# Patient Record
Sex: FEMALE | Race: OTHER | HISPANIC OR LATINO | URBAN - METROPOLITAN AREA
[De-identification: names, ages, dates, MRNs, and addresses within clinical notes are randomized per-mention and may not be internally consistent; named-entity substitution may affect disease eponyms.]

---
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
## 2023-01-20 ENCOUNTER — EMERGENCY (EMERGENCY)
Facility: HOSPITAL | Age: 18
LOS: 1 days | Discharge: HOME | End: 2023-01-20
Attending: EMERGENCY MEDICINE
Payer: COMMERCIAL

## 2023-01-20 PROCEDURE — 99284 EMERGENCY DEPT VISIT MOD MDM: CPT

## 2023-01-21 VITALS
RESPIRATION RATE: 16 BRPM | SYSTOLIC BLOOD PRESSURE: 110 MMHG | TEMPERATURE: 98 F | HEART RATE: 78 BPM | DIASTOLIC BLOOD PRESSURE: 70 MMHG | OXYGEN SATURATION: 98 %

## 2023-01-21 VITALS
WEIGHT: 130.07 LBS | SYSTOLIC BLOOD PRESSURE: 111 MMHG | DIASTOLIC BLOOD PRESSURE: 67 MMHG | RESPIRATION RATE: 16 BRPM | OXYGEN SATURATION: 97 % | HEART RATE: 82 BPM | TEMPERATURE: 98 F

## 2023-01-21 LAB
FLUAV AG NPH QL: SIGNIFICANT CHANGE UP
FLUBV AG NPH QL: SIGNIFICANT CHANGE UP
HCG SERPL-ACNC: <4 MIU/ML — SIGNIFICANT CHANGE UP
RSV RNA NPH QL NAA+NON-PROBE: SIGNIFICANT CHANGE UP
SARS-COV-2 RNA SPEC QL NAA+PROBE: DETECTED

## 2023-01-21 PROCEDURE — 99284 EMERGENCY DEPT VISIT MOD MDM: CPT | Mod: 25

## 2023-01-21 PROCEDURE — 96361 HYDRATE IV INFUSION ADD-ON: CPT

## 2023-01-21 PROCEDURE — 96375 TX/PRO/DX INJ NEW DRUG ADDON: CPT

## 2023-01-21 PROCEDURE — 96374 THER/PROPH/DIAG INJ IV PUSH: CPT

## 2023-01-21 PROCEDURE — 84702 CHORIONIC GONADOTROPIN TEST: CPT

## 2023-01-21 PROCEDURE — 36415 COLL VENOUS BLD VENIPUNCTURE: CPT

## 2023-01-21 PROCEDURE — 87637 SARSCOV2&INF A&B&RSV AMP PRB: CPT

## 2023-01-21 RX ORDER — METOCLOPRAMIDE HCL 10 MG
10 TABLET ORAL ONCE
Refills: 0 | Status: DISCONTINUED | OUTPATIENT
Start: 2023-01-21 | End: 2023-01-28

## 2023-01-21 RX ORDER — ACETAMINOPHEN 500 MG
1000 TABLET ORAL ONCE
Refills: 0 | Status: COMPLETED | OUTPATIENT
Start: 2023-01-21 | End: 2023-01-21

## 2023-01-21 RX ORDER — KETOROLAC TROMETHAMINE 30 MG/ML
15 SYRINGE (ML) INJECTION ONCE
Refills: 0 | Status: DISCONTINUED | OUTPATIENT
Start: 2023-01-21 | End: 2023-01-21

## 2023-01-21 RX ORDER — SODIUM CHLORIDE 9 MG/ML
1000 INJECTION INTRAMUSCULAR; INTRAVENOUS; SUBCUTANEOUS ONCE
Refills: 0 | Status: COMPLETED | OUTPATIENT
Start: 2023-01-21 | End: 2023-01-21

## 2023-01-21 RX ADMIN — SODIUM CHLORIDE 1000 MILLILITER(S): 9 INJECTION INTRAMUSCULAR; INTRAVENOUS; SUBCUTANEOUS at 02:05

## 2023-01-21 RX ADMIN — Medication 15 MILLIGRAM(S): at 02:01

## 2023-01-21 RX ADMIN — Medication 400 MILLIGRAM(S): at 02:02

## 2023-01-21 RX ADMIN — Medication 200 MILLIGRAM(S): at 02:01

## 2023-01-21 NOTE — ED PROVIDER NOTE - PATIENT PORTAL LINK FT
You can access the FollowMyHealth Patient Portal offered by [facility] by registering at the following website: [email or web address]. By joining [employer]’s FollowMyHealth portal, you will also be able to view your health information using other applications (apps) compatible with our system.

## 2023-01-21 NOTE — ED ADULT NURSE NOTE - CAS ELECT INFOMATION PROVIDED
DC instructions given by MD and verbalized understanding. Pt remains alert and O x4. NAD noted. Resp E/U../DC instructions

## 2023-01-21 NOTE — ED PROVIDER NOTE - CLINICAL SUMMARY MEDICAL DECISION MAKING FREE TEXT BOX
17F with pmh migraines presenting with URI sx and migraine HA. Given IVF, iv reglan, iv tylenol. On PE reproducible chest pain consistent with costochondritis 2/2 cough. Flu swab sent. 17F with pmh migraines presenting with URI sx and migraine HA. Given IVF, iv reglan, iv tylenol. On PE reproducible chest pain consistent with costochondritis 2/2 cough. Flu swab sent.    Covid +. On re assessment, pt feeling improved. migraine headache resolving. Pt asking for discharge. Vitals remained stable. Discussed strict return precautions.

## 2023-01-21 NOTE — ED PROVIDER NOTE - CARE PROVIDER_API CALL
Andrea Booker; PhD)  Neurology; Vascular Neurology  [address] [place] [address]  Phone: [phone]  Fax: [phone]  Follow Up Time:

## 2023-01-21 NOTE — ED ADULT NURSE NOTE - CHIEF COMPLAINT QUOTE
Ambulatory to ED c/o headache and difficulty breathing x3 days. Patient denies cough or subjective fever sx, tearful at triage, RR even and unlabored.

## 2023-01-21 NOTE — ED PROVIDER NOTE - NSFOLLOWUPINSTRUCTIONS_ED_ALL_ED_FT
Follow up with PCP within 3 days.   Motrin and tylenol for any pain or fever.   Return for any worsening symptoms.     - Return to the ED for any new or worsening symptoms including but not limited to difficulty breathing, severe weakness, confusion, etc.  - Take Tylenol (Acetaminophen) 650mg as needed for pain or fever  - Stay well hydrated.   - Avoid contact with anybody who is sick OR at risk populations including elderly, young, pregnant patients, immune compromised people  - Wash hands frequently in warm soapy water for at least 20 seconds     Viral Respiratory Infection    A viral respiratory infection is an illness that affects parts of the body used for breathing, like the lungs, nose, and throat. It is caused by a germ called a virus. Symptoms can include runny nose, coughing, sneezing, fatigue, body aches, sore throat, fever, or headache. Over the counter medicine can be used to manage the symptoms but the infection typically goes away on its own in 5 to 10 days.     SEEK IMMEDIATE MEDICAL CARE IF YOU HAVE ANY OF THE FOLLOWING SYMPTOMS: shortness of breath, chest pain, fever over 10 days, or lightheadedness/dizziness.    Headache    A headache is pain or discomfort felt around the head or neck area. The specific cause of a headache may not be found as there are many types including tension headaches, migraine headaches, and cluster headaches. Watch your condition for any changes. Things you can do to manage your pain include taking over the counter and prescription medications as instructed by your health care provider, lying down in a dark quiet room, limiting stress, getting regular sleep, and refraining from alcohol and tobacco products.    SEEK IMMEDIATE MEDICAL CARE IF YOU HAVE ANY OF THE FOLLOWING SYMPTOMS: fever, vomiting, stiff neck, loss of vision, problems with speech, muscle weakness, loss of balance, trouble walking, passing out, or confusion.    Daron un seguimiento con el PCP dentro de los 3 días.  Motrin y tylenol para cualquier dolor o fiebre.  Regrese por cualquier empeoramiento de los síntomas.    - Regrese al servicio de urgencias por cualquier síntoma nuevo o que empeore, incluidos, entre otros, dificultad para respirar, debilidad grave, confusión, etc.  - [facility] Tylenol (acetaminofén) 650 mg según sea necesario para el dolor o la fiebre  - Manténgase janette hidratado.  - Evite el contacto con cualquier persona que esté enferma O con poblaciones de riesgo, incluidos ancianos, jóvenes, pacientes embarazadas, personas inmunodeprimidas  - Lávese las ramirez con frecuencia con agua tibia y jabón abelino al menos 20 segundos    Infección respiratoria viral    Alejandra infección respiratoria viral es alejandra enfermedad que afecta partes del cuerpo que se usan para respirar, reyes los pulmones, la nariz y la garganta. Es causada por un germen llamado virus. Los síntomas pueden incluir secreción nasal, tos, estornudos, fatiga, samara corporales, dolor de garganta, fiebre o dolor de manjula. Se pueden usar medicamentos de venta natasha para controlar los síntomas, conner la infección generalmente desaparece por sí raghav en 5 a 10 días.    BUSQUE ATENCIÓN MÉDICA INMEDIATA SI TIENE ALGUNO DE LOS SIGUIENTES SÍNTOMAS: dificultad para respirar, dolor en el pecho, fiebre abelino 10 días o aturdimiento/mareos.    Dolor de manjula    Un dolor de manjula es un dolor o molestia que se siente alrededor del área de la manjula o el bradley. Es posible que no se encuentre la causa específica de un dolor de manjula, ya que hay muchos tipos, incluidos los samara de manjula por tensión, los samara de manjula por migraña y los samara de manjula en racimo. Marge garcia condición para cualquier cambio. Las cosas que puede hacer para controlar garcia dolor incluyen josephine medicamentos de venta natasha y recetados según las instrucciones de garcia proveedor de atención médica, recostarse en alejandra habitación oscura y tranquila, limitar el estrés, dormir con regularidad y abstenerse de consumir alcohol y productos de tabaco.    BUSQUE ATENCIÓN MÉDICA INMEDIATA SI TIENE ALGUNO DE LOS SIGUIENTES SÍNTOMAS: fiebre, vómitos, rigidez en el bradley, pérdida de la visión, problemas con el habla, debilidad muscular, pérdida del equilibrio, dificultad para caminar, desmayo o confusión.

## 2023-01-21 NOTE — ED PROVIDER NOTE - ATTENDING APP SHARED VISIT CONTRIBUTION OF CARE
I, Tia Anglin, performed a face to face bedside interview with this patient regarding history of present illness, review of symptoms and relevant past medical, social and family history.  I completed an independent physical examination. Medical decision making, follow-up on ordered tests (ie labs, radiologic studies) and re-evaluation of the patient's status has been communicated to the ACP.  Disposition of the patient will be based on test outcome and response to ED interventions.

## 2023-01-21 NOTE — ED PROVIDER NOTE - OBJECTIVE STATEMENT
17F with pmh migraine headaches presenting with cold sx and left sided headache.   Pt states she had 3 days of runny nose, sore throat, cough, chest pain. 2 days ago subjective fever. chest pain worse with cough. Pt has been taking old Rx of Augmentin for the cough at home.   Pt states the headache feels similar to prior migraines but worse pain now. Pain is mostly behind the left eye. With sensitivity to light and sound. Took 600mg ibuprofen at home without relief

## 2023-04-03 ENCOUNTER — EMERGENCY (EMERGENCY)
Facility: HOSPITAL | Age: 18
LOS: 1 days | Discharge: HOME | End: 2023-04-03
Attending: EMERGENCY MEDICINE
Payer: COMMERCIAL

## 2023-04-03 VITALS
SYSTOLIC BLOOD PRESSURE: 110 MMHG | WEIGHT: 119.05 LBS | HEART RATE: 132 BPM | HEIGHT: 64 IN | TEMPERATURE: 100 F | DIASTOLIC BLOOD PRESSURE: 60 MMHG | OXYGEN SATURATION: 98 % | RESPIRATION RATE: 18 BRPM

## 2023-04-03 LAB — S PYO DNA THROAT QL NAA+PROBE: SIGNIFICANT CHANGE UP

## 2023-04-03 PROCEDURE — 99284 EMERGENCY DEPT VISIT MOD MDM: CPT

## 2023-04-03 PROCEDURE — 87651 STREP A DNA AMP PROBE: CPT

## 2023-04-03 PROCEDURE — T1013: CPT

## 2023-04-03 PROCEDURE — 87798 DETECT AGENT NOS DNA AMP: CPT

## 2023-04-03 PROCEDURE — 99283 EMERGENCY DEPT VISIT LOW MDM: CPT

## 2023-04-03 RX ORDER — IBUPROFEN 200 MG
400 TABLET ORAL ONCE
Refills: 0 | Status: COMPLETED | OUTPATIENT
Start: 2023-04-03 | End: 2023-04-03

## 2023-04-03 RX ADMIN — Medication 400 MILLIGRAM(S): at 20:02

## 2023-04-03 NOTE — ED ADULT TRIAGE NOTE - CHIEF COMPLAINT QUOTE
mom states dgtr has flulike symptoms, cough, fever, onset 3 days ago,   A&Ox3, resp wnl, c/o throat pain, B/L ear pain, Tylenol 2 pm

## 2023-04-03 NOTE — ED PROVIDER NOTE - PATIENT PORTAL LINK FT
You can access the FollowMyHealth Patient Portal offered by [facility] by registering at the following website: [email or web address]. By joining [employer]’s FollowMyHealth portal, you will also be able to view your health information using other applications (apps) compatible with our system.

## 2023-04-21 ENCOUNTER — OFFICE (OUTPATIENT)
Dept: URBAN - METROPOLITAN AREA CLINIC 94 | Facility: CLINIC | Age: 18
Setting detail: OPHTHALMOLOGY
End: 2023-04-21
Payer: COMMERCIAL

## 2023-04-21 DIAGNOSIS — H04.123: ICD-10-CM

## 2023-04-21 DIAGNOSIS — H16.041: ICD-10-CM

## 2023-04-21 PROCEDURE — 92002 INTRM OPH EXAM NEW PATIENT: CPT | Performed by: OPHTHALMOLOGY

## 2023-04-21 ASSESSMENT — CONFRONTATIONAL VISUAL FIELD TEST (CVF)
OD_FINDINGS: FULL
OS_FINDINGS: FULL

## 2023-04-21 ASSESSMENT — REFRACTION_CURRENTRX
OD_SPHERE: -0.75
OD_OVR_VA: 20/
OD_CYLINDER: -0.50
OS_OVR_VA: 20/
OS_CYLINDER: -0.25
OS_SPHERE: -2.00
OD_AXIS: 008
OD_VPRISM_DIRECTION: SV
OS_AXIS: 005
OS_VPRISM_DIRECTION: SV

## 2023-04-21 ASSESSMENT — REFRACTION_AUTOREFRACTION
OD_AXIS: 169
OD_SPHERE: -0.75
OS_CYLINDER: -0.25
OD_CYLINDER: -0.50
OS_SPHERE: -2.25
OS_AXIS: 008

## 2023-04-21 ASSESSMENT — SPHEQUIV_DERIVED
OD_SPHEQUIV: -1
OD_SPHEQUIV: -1
OS_SPHEQUIV: -2.375
OS_SPHEQUIV: -2.125

## 2023-04-21 ASSESSMENT — KERATOMETRY
OD_K2POWER_DIOPTERS: 44.75
OS_K1POWER_DIOPTERS: 44.25
OS_AXISANGLE_DEGREES: 104
OD_K1POWER_DIOPTERS: 44.00
OS_K2POWER_DIOPTERS: 44.75
OD_AXISANGLE_DEGREES: 085

## 2023-04-21 ASSESSMENT — SUPERFICIAL PUNCTATE KERATITIS (SPK)
OS_SPK: 1+
OD_SPK: 1+

## 2023-04-21 ASSESSMENT — REFRACTION_MANIFEST
OD_VA1: 20/20
OS_CYLINDER: -0.25
OD_CYLINDER: -0.50
OD_SPHERE: -0.75
OS_VA1: 20/20
OD_AXIS: 008
OS_SPHERE: -2.00
OS_AXIS: 005

## 2023-04-21 ASSESSMENT — AXIALLENGTH_DERIVED
OS_AL: 24.1634
OD_AL: 23.6603
OD_AL: 23.6603
OS_AL: 24.0617

## 2023-04-21 ASSESSMENT — VISUAL ACUITY
OD_BCVA: 20/25
OS_BCVA: 20/25

## 2023-09-02 ENCOUNTER — EMERGENCY (EMERGENCY)
Facility: HOSPITAL | Age: 18
LOS: 1 days | Discharge: HOME | End: 2023-09-02
Attending: EMERGENCY MEDICINE
Payer: COMMERCIAL

## 2023-09-02 VITALS
OXYGEN SATURATION: 100 % | DIASTOLIC BLOOD PRESSURE: 77 MMHG | HEIGHT: 62.99 IN | WEIGHT: 110.23 LBS | SYSTOLIC BLOOD PRESSURE: 122 MMHG | TEMPERATURE: 98 F | RESPIRATION RATE: 16 BRPM | HEART RATE: 83 BPM

## 2023-09-02 LAB
APPEARANCE UR: CLEAR — SIGNIFICANT CHANGE UP
BILIRUB UR-MCNC: NEGATIVE — SIGNIFICANT CHANGE UP
COLOR SPEC: YELLOW — SIGNIFICANT CHANGE UP
DIFF PNL FLD: ABNORMAL
EPI CELLS # UR: SIGNIFICANT CHANGE UP
GLUCOSE UR QL: NEGATIVE MG/DL — SIGNIFICANT CHANGE UP
HCG UR QL: NEGATIVE — SIGNIFICANT CHANGE UP
KETONES UR-MCNC: NEGATIVE — SIGNIFICANT CHANGE UP
LEUKOCYTE ESTERASE UR-ACNC: ABNORMAL
NITRITE UR-MCNC: NEGATIVE — SIGNIFICANT CHANGE UP
PH UR: 6.5 — SIGNIFICANT CHANGE UP (ref 5–8)
PROT UR-MCNC: 100
RBC CASTS # UR COMP ASSIST: SIGNIFICANT CHANGE UP /HPF (ref 0–4)
SP GR SPEC: 1.02 — SIGNIFICANT CHANGE UP (ref 1.01–1.02)
UROBILINOGEN FLD QL: NEGATIVE MG/DL — SIGNIFICANT CHANGE UP
WBC UR QL: SIGNIFICANT CHANGE UP /HPF (ref 0–5)

## 2023-09-02 PROCEDURE — 99284 EMERGENCY DEPT VISIT MOD MDM: CPT

## 2023-09-02 PROCEDURE — T1013: CPT

## 2023-09-02 PROCEDURE — 87591 N.GONORRHOEAE DNA AMP PROB: CPT

## 2023-09-02 PROCEDURE — 87491 CHLMYD TRACH DNA AMP PROBE: CPT

## 2023-09-02 PROCEDURE — 87086 URINE CULTURE/COLONY COUNT: CPT

## 2023-09-02 PROCEDURE — 99283 EMERGENCY DEPT VISIT LOW MDM: CPT

## 2023-09-02 PROCEDURE — 81001 URINALYSIS AUTO W/SCOPE: CPT

## 2023-09-02 PROCEDURE — 81025 URINE PREGNANCY TEST: CPT

## 2023-09-02 RX ORDER — CEPHALEXIN 500 MG
1 CAPSULE ORAL
Qty: 10 | Refills: 0
Start: 2023-09-02 | End: 2023-09-06

## 2023-09-02 RX ORDER — CEPHALEXIN 500 MG
500 CAPSULE ORAL ONCE
Refills: 0 | Status: COMPLETED | OUTPATIENT
Start: 2023-09-02 | End: 2023-09-02

## 2023-09-02 RX ADMIN — Medication 500 MILLIGRAM(S): at 21:26

## 2023-09-02 NOTE — ED PROVIDER NOTE - CLINICAL SUMMARY MEDICAL DECISION MAKING FREE TEXT BOX
16 y/o female with no sign medical history presents to the Ed c/o suprapubic pain and burning with urination for the past 4 days. 16 y/o female with no sign medical history presents to the Ed c/o suprapubic pain and burning with urination for the past 4 days. urinalysis not convincing but will treat symptoms, pregnancy negative, Pt reassessed, pt feeling better at this time, vss, pt able to walk, talk and vocalized plan of action. Discussed in depth and explained to pt in depth the next steps that need to be taking including proper follow up with PCP or specialists. All incidental findings were discussed with pt as well. Pt verbalized their concerns and all questions were answered. Pt understands dispo and wants discharge. Given good instructions when to return to ED and importance of f/u.

## 2023-09-02 NOTE — ED ADULT NURSE NOTE - CADM POA PRESS ULCER
[General Appearance - Well Developed] : well developed [General Appearance - Well Nourished] : well nourished [Abdomen Soft] : soft [Skin Color & Pigmentation] : normal skin color and pigmentation [] : no respiratory distress [Oriented To Time, Place, And Person] : oriented to person, place, and time [Affect] : the affect was normal [Normal Station and Gait] : the gait and station were normal for the patient's age [No Focal Deficits] : no focal deficits [FreeTextEntry1] : no pallor  No

## 2023-09-02 NOTE — ED PROVIDER NOTE - PATIENT PORTAL LINK FT
You can access the FollowMyHealth Patient Portal offered by [facility] by registering at the following website: [email or web address]. By joining [employer]’s FollowMyHealth portal, you will also be able to view your health information using other applications (apps) compatible with our system.

## 2023-09-02 NOTE — ED ADULT NURSE NOTE - OBJECTIVE STATEMENT
Pt received A&Ox4 c/o suprapubic pain with burning with urination x 4 days. Pt denies any fevers/chills. Respirations even & unlabored. NAD. Pt made aware of plan of care and verbalized understanding.

## 2023-09-02 NOTE — ED PROVIDER NOTE - OBJECTIVE STATEMENT
16 y/o female with no sign medical history presents to the Ed c/o suprapubic pain and burning with urination for the past 4 days. Pt notes gradual onset of pain. not sexually active. No vaginal discharge, no fevers, no chills.

## 2023-09-04 LAB
CULTURE RESULTS: SIGNIFICANT CHANGE UP
SPECIMEN SOURCE: SIGNIFICANT CHANGE UP

## 2023-09-05 LAB
C TRACH RRNA SPEC QL NAA+PROBE: SIGNIFICANT CHANGE UP
N GONORRHOEA RRNA SPEC QL NAA+PROBE: SIGNIFICANT CHANGE UP

## 2024-08-20 NOTE — ED ADULT TRIAGE NOTE - CHIEF COMPLAINT QUOTE
Ambulatory to ED c/o headache and difficulty breathing x3 days. Patient denies cough or subjective fever sx, tearful at triage, RR even and unlabored. yes

## 2024-10-14 ENCOUNTER — EMERGENCY (EMERGENCY)
Facility: HOSPITAL | Age: 19
LOS: 1 days | Discharge: HOME | End: 2024-10-14
Attending: STUDENT IN AN ORGANIZED HEALTH CARE EDUCATION/TRAINING PROGRAM
Payer: COMMERCIAL

## 2024-10-14 VITALS
WEIGHT: 138.89 LBS | RESPIRATION RATE: 20 BRPM | OXYGEN SATURATION: 98 % | HEIGHT: 63 IN | DIASTOLIC BLOOD PRESSURE: 81 MMHG | TEMPERATURE: 98 F | SYSTOLIC BLOOD PRESSURE: 126 MMHG | HEART RATE: 88 BPM

## 2024-10-14 PROCEDURE — 99284 EMERGENCY DEPT VISIT MOD MDM: CPT

## 2024-10-14 NOTE — ED ADULT TRIAGE NOTE - CHIEF COMPLAINT QUOTE
Pt arrives to ED c/o L sided rib cage  pain started a week ago progressively getting worse. breathing easy and unlabored

## 2024-10-15 PROCEDURE — 71046 X-RAY EXAM CHEST 2 VIEWS: CPT

## 2024-10-15 PROCEDURE — 71046 X-RAY EXAM CHEST 2 VIEWS: CPT | Mod: 26

## 2024-10-15 PROCEDURE — T1013: CPT

## 2024-10-15 PROCEDURE — 99283 EMERGENCY DEPT VISIT LOW MDM: CPT | Mod: 25

## 2024-10-15 PROCEDURE — 93010 ELECTROCARDIOGRAM REPORT: CPT

## 2024-10-15 PROCEDURE — 93005 ELECTROCARDIOGRAM TRACING: CPT

## 2024-10-15 RX ORDER — ACETAMINOPHEN 325 MG
650 TABLET ORAL ONCE
Refills: 0 | Status: COMPLETED | OUTPATIENT
Start: 2024-10-15 | End: 2024-10-15

## 2024-10-15 RX ADMIN — Medication 650 MILLIGRAM(S): at 01:20

## 2024-10-15 RX ADMIN — Medication 1500 MILLIGRAM(S): at 01:20

## 2024-10-15 RX ADMIN — Medication 600 MILLIGRAM(S): at 01:20

## 2024-10-15 NOTE — ED PROVIDER NOTE - PHYSICAL EXAMINATION
General: In NAD, non-toxic/well-appearing.  Skin: No rashes or lesions. Warm, dry, color normal for race.   Head: Normocephalic/atraumatic.   Eyes: Sclera anicteric, conjunctivae clear b/l. PERRLA, EOMI.   Neck: Supple, FROM.   Cardio: +Reproducible chest wall tenderness. Rate and rhythm regular. No audible murmur.  PV: No edema of feet/ankles. No calf swelling/tenderness.   Resp: Breath sounds vesicular, symmetrical and without rales, rhonchi or wheezing b/l.  Abd: Non-distended. Soft, non-tender. No rebound, guarding.   MSK: MAEx4. FROM. Strength 5/5 upper and lower extremities.   Neuro: A&Ox3. Appears nonfocal.

## 2024-10-15 NOTE — ED PROVIDER NOTE - NSFOLLOWUPINSTRUCTIONS_ED_ALL_ED_FT
- Prescription sent to pharmacy.  - Ibuprofen 600mg every 6 hours as needed for pain.  - Acetaminophen 650mg every 6 hours as needed for pain.   - Please bring all documentation from your ED visit to any related future follow up appointment.  - Please call to schedule follow up appointment with your primary care physician within 24-48 hours.  - Please seek immediate medical attention or return to the ED for any new/worsening, signs/symptoms, or concerns.      - Receta enviada a farmacia.  - Ibuprofeno 600 mg cada 6 horas según sea necesario para el dolor.  - Acetaminofén 650 mg cada 6 horas según sea necesario para el dolor.   - Traiga toda la documentación de garcia visita al servicio de urgencias a cualquier ling de seguimiento futura relacionada.  - Llame para programar alejandra ling de seguimiento con garcia médico de atención primaria dentro de las 24 a 48 horas.  - Busque atención médica inmediata o regrese al servicio de urgencias si detecta signos, síntomas o inquietudes nuevos o que empeoran.    Dolor de pecho inespecífico    Nonspecific Chest Pain      El dolor de pecho puede deberse a muchas afecciones diferentes. Algunas causas del dolor de pecho pueden ser potencialmente mortales. Estas requieren tratamiento inmediato. Algunas causas grave de dolor en el pecho son las siguientes:  •Infarto de miocardio.      •Un desgarro en el vaso sanguíneo principal del cuerpo.      •Enrojecimiento e hinchazón (inflamación) alrededor del corazón.      •Un coágulo de didi en los pulmones.      Otras causas de dolor en el pecho pueden no ser tan graves. Estos incluyen los siguientes:  •Acidez estomacal.      •Ansiedad o estrés.      •Daño en los huesos o músculos del corazón.      •Infecciones pulmonares.      El dolor de pecho puede provocar las siguientes sensaciones:  •Dolor o molestias en el pecho.      •Dolor opresivo, continuo o constrictivo.       •Ardor u hormigueo.       •Dolor sordo o intenso que empeora al moverse, toser o inhalar profundamente.       •Dolor o molestias que también se sienten en la espalda, el bradley, la mandíbula, el hombro o el brazo, o dolor que se extiende a cualquiera de estas zonas.      Es difícil saber si la causa del dolor es algo grave o algo que no es tan grave. Por lo tanto, es importante que consulte al médico inmediatamente si tiene dolor en el pecho.      Siga estas indicaciones en garcia casa:    Medicamentos     •[place] los medicamentos de venta natasha y los recetados solamente reyes se lo haya indicado el médico.      •Si le recetaron un antibiótico, tómelo reyes se lo haya indicado el médico. No deje de josephine los antibióticos aunque comience a sentirse mejor.        Estilo de cecy      •Ly[place] reposo reyes se lo haya indicado el médico.      • No consuma ningún producto que contenga nicotina o tabaco, reyes cigarrillos, cigarrillos electrónicos y tabaco de mascar. Si necesita ayuda para dejar de fumar, consulte al médico.      • No daniela alcohol.    •Lyly cambios en garcia estilo de cecy según las indicaciones del médico. Estos pueden incluir lo siguiente:  •Practicar actividad física con regularidad. Pregúntele al médico qué actividades son seguras para usted.      •Seguir alejandra dieta cardiosaludable. Un especialista en dietas y alimentación (nutricionista) puede ayudarlo a que lyly elecciones saludables.      •Mantener un peso saludable.      •Tratar la diabetes o la presión arterial brittany, si es necesario.      •Reducir el estrés. Las actividades reyes el yoga y las técnicas de relajación pueden ayudar.        Indicaciones generales     •Esté atento a cualquier cambio en los síntomas. Informe a garcia médico si presenta algún cambio en los síntomas o si aparecen síntomas nuevos.      •Evite las actividades que le causen dolor de pecho.      •Concurra a todas las visitas de seguimiento reyes se lo haya indicado el médico. [place] es importante. Es posible que tenga que someterse a más estudios si el dolor de pecho no desaparece.        Comuníquese con un médico si:    •El dolor de pecho no desaparece.      •Se siente deprimido.      •Tiene fiebre.        Solicite ayuda inmediatamente si:    •El dolor en el pecho es más intenso.      •Tiene tos que empeora o tose con didi.      •Tiene dolor muy intenso en el vientre (abdomen).      •Pierde el conocimiento (se desmaya).    •Tiene cualquiera de estos síntomas sin ninguna causa joe:  •Malestar repentino en el pecho.      •Molestias repentinas en los brazos, la espalda, el bradley o la mandíbula.        •Le falta el aire en cualquier momento.      •Comienza a sudar de manera repentina o la piel se le humedece.      •Siente malestar estomacal (náuseas).      •Vomita.      •Se siente repentinamente mareado o se desmaya.      •Se siente muy débil o cansado.      •El corazón comienza a latirle rápidamente o parece que se saltea latidos.      Estos síntomas pueden indicar alejandra emergencia. No espere a anna si los síntomas desaparecen. Solicite atención médica de inmediato. Comuníquese con el servicio de emergencias de garcia localidad (911 en los [place]). No conduzca por zach propios medios hasta el hospital.       Resumen    •El dolor de pecho puede deberse a muchas afecciones diferentes. La causa puede ser grave y requerir tratamiento de inmediato. Si tiene dolor de pecho, consulte al médico de inmediato.      •Siga las indicaciones del médico para josephine los medicamentos y hacer cambios en garcia estilo de cecy.       •Concurra a todas las visitas de seguimiento reyes se lo haya indicado el médico. [place] incluye las visitas para realizarle otros estudios si el dolor de pecho no desaparece.      •Asegúrese de conocer los signos que indican que garcia afección ha empeorado. Obtenga ayuda de inmediato si tiene esos síntomas.      Esta información no tiene reyes fin reemplazar el consejo del médico. Asegúrese de hacerle al médico cualquier pregunta que tenga.

## 2024-10-15 NOTE — ED PROVIDER NOTE - PATIENT PORTAL LINK FT
You can access the FollowMyHealth Patient Portal offered by [facility] by registering at the following website: [email or web address]. By joining [employer]’s FollowMyHealth portal, you will also be able to view your health information using other applications (apps) compatible with our system.

## 2024-10-15 NOTE — ED PROVIDER NOTE - NSFOLLOWUPCLINICS_GEN_ALL_ED_FT
Cardiology at [place] ([facility])  Cardiology  [address], [address]  [place] [address]  Phone: [phone]  Fax:

## 2024-10-15 NOTE — ED PROVIDER NOTE - OBJECTIVE STATEMENT
18 yo female no PMHx presents to ED c/o chest pain x1 week. Reports intermittent "stabbing" pain to center of chest. Also with pain over left side of ribs. Associated with difficulty breathing. Pain began after lifting heavy at work. Did not self medicate PTA. No further complaints at this time.   Denies cigarette/vaping, OCP use, recent travel/surgeries, recent illness, vomiting.

## 2024-10-15 NOTE — ED PROVIDER NOTE - CLINICAL SUMMARY MEDICAL DECISION MAKING FREE TEXT BOX
20 yo female no PMHx presents to ED c/o chest pain x1 week. Reports intermittent "stabbing" pain to center of chest. Also with pain over left side of ribs. Associated with difficulty breathing. Pain began after lifting heavy at work. Given age <50, HR <100, SpO2 >95% on RA, no prior h/o DVT/PE, no recent trauma/surgery, no hemoptysis, no exogenous estrogens or unilateral LE swelling, the patient does not require further eval for PE. EKG, CXR negative. Medically stable for discharge.

## 2024-10-15 NOTE — ED PROVIDER NOTE - ATTENDING APP SHARED VISIT CONTRIBUTION OF CARE
18 yo female no PMHx presents to ED c/o chest pain x1 week. stabbing sensation to middle of chest.  Pain began after lifting heavy at work. No cardiac history. reproducible chest wall pain, more likely msk, ekg nonischemic

## 2024-10-15 NOTE — ED ADULT NURSE NOTE - OBJECTIVE STATEMENT
pt A&Ox4, c/o worsening chest pain and sob that started after lifting something heavy at work, resp even and unlabored no distress noted

## 2024-10-15 NOTE — ED PROVIDER NOTE - CARE PROVIDER_API CALL
Rahel Rodríguez  Internal Medicine  [address] [place] [id number]  Phone: [phone]  Fax: [phone]  Follow Up Time:

## 2024-11-11 NOTE — ED PROVIDER NOTE - CARE PLAN
Quality 226: Preventive Care And Screening: Tobacco Use: Screening And Cessation Intervention: Patient screened for tobacco use and is an ex/non-smoker Quality 431: Preventive Care And Screening: Unhealthy Alcohol Use - Screening: Patient not identified as an unhealthy alcohol user when screened for unhealthy alcohol use using a systematic screening method Detail Level: Detailed Quality 130: Documentation Of Current Medications In The Medical Record: Current Medications Documented Principal Discharge DX:	Migraine   1 Principal Discharge DX:	Migraine  Secondary Diagnosis:	COVID

## 2025-01-22 PROBLEM — Z00.00 ENCOUNTER FOR PREVENTIVE HEALTH EXAMINATION: Status: ACTIVE | Noted: 2025-01-22

## 2025-02-04 ENCOUNTER — NON-APPOINTMENT (OUTPATIENT)
Age: 20
End: 2025-02-04

## 2025-02-04 ENCOUNTER — APPOINTMENT (OUTPATIENT)
Dept: CARDIOLOGY | Facility: CLINIC | Age: 20
End: 2025-02-04

## 2025-02-04 VITALS
SYSTOLIC BLOOD PRESSURE: 118 MMHG | BODY MASS INDEX: 23.57 KG/M2 | DIASTOLIC BLOOD PRESSURE: 72 MMHG | HEIGHT: 63 IN | WEIGHT: 133 LBS | HEART RATE: 78 BPM | OXYGEN SATURATION: 96 %

## 2025-02-04 VITALS — SYSTOLIC BLOOD PRESSURE: 116 MMHG | DIASTOLIC BLOOD PRESSURE: 74 MMHG

## 2025-02-04 DIAGNOSIS — Z78.9 OTHER SPECIFIED HEALTH STATUS: ICD-10-CM

## 2025-02-04 DIAGNOSIS — R07.9 CHEST PAIN, UNSPECIFIED: ICD-10-CM

## 2025-02-04 DIAGNOSIS — K21.9 GASTRO-ESOPHAGEAL REFLUX DISEASE W/OUT ESOPHAGITIS: ICD-10-CM

## 2025-02-04 PROCEDURE — 99204 OFFICE O/P NEW MOD 45 MIN: CPT | Mod: 25

## 2025-02-04 PROCEDURE — 93000 ELECTROCARDIOGRAM COMPLETE: CPT

## 2025-02-04 RX ORDER — OMEPRAZOLE 10 MG/1
10 CAPSULE, DELAYED RELEASE ORAL
Refills: 0 | Status: ACTIVE | COMMUNITY

## 2025-03-03 ENCOUNTER — APPOINTMENT (OUTPATIENT)
Dept: CARDIOLOGY | Facility: CLINIC | Age: 20
End: 2025-03-03

## 2025-03-18 ENCOUNTER — APPOINTMENT (OUTPATIENT)
Dept: CARDIOLOGY | Facility: CLINIC | Age: 20
End: 2025-03-18
Payer: MEDICAID

## 2025-03-18 PROCEDURE — 93015 CV STRESS TEST SUPVJ I&R: CPT

## 2025-04-15 ENCOUNTER — APPOINTMENT (OUTPATIENT)
Dept: CARDIOLOGY | Facility: CLINIC | Age: 20
End: 2025-04-15

## 2025-08-07 ENCOUNTER — OFFICE (OUTPATIENT)
Dept: URBAN - METROPOLITAN AREA CLINIC 94 | Facility: CLINIC | Age: 20
Setting detail: OPHTHALMOLOGY
End: 2025-08-07
Payer: COMMERCIAL

## 2025-08-07 ENCOUNTER — EMERGENCY (EMERGENCY)
Facility: HOSPITAL | Age: 20
LOS: 1 days | End: 2025-08-07
Attending: EMERGENCY MEDICINE
Payer: COMMERCIAL

## 2025-08-07 VITALS
DIASTOLIC BLOOD PRESSURE: 84 MMHG | OXYGEN SATURATION: 98 % | RESPIRATION RATE: 20 BRPM | HEIGHT: 63 IN | TEMPERATURE: 98 F | HEART RATE: 89 BPM | SYSTOLIC BLOOD PRESSURE: 130 MMHG | WEIGHT: 119.93 LBS

## 2025-08-07 DIAGNOSIS — Q10.3: ICD-10-CM

## 2025-08-07 DIAGNOSIS — H04.123: ICD-10-CM

## 2025-08-07 DIAGNOSIS — H52.13: ICD-10-CM

## 2025-08-07 PROCEDURE — 92014 COMPRE OPH EXAM EST PT 1/>: CPT | Performed by: OPHTHALMOLOGY

## 2025-08-07 PROCEDURE — 99284 EMERGENCY DEPT VISIT MOD MDM: CPT

## 2025-08-07 PROCEDURE — 92015 DETERMINE REFRACTIVE STATE: CPT | Performed by: OPHTHALMOLOGY

## 2025-08-07 ASSESSMENT — REFRACTION_AUTOREFRACTION
OD_SPHERE: -0.50
OS_SPHERE: -1.75
OS_CYLINDER: -0.50
OS_AXIS: 006
OD_AXIS: 002
OD_CYLINDER: -0.75

## 2025-08-07 ASSESSMENT — REFRACTION_MANIFEST
OD_CYLINDER: -0.50
OD_SPHERE: -0.75
OS_CYLINDER: -0.50
OD_AXIS: 005
OD_VA1: 20/20
OS_VA1: 20/20
OS_AXIS: 005
OS_SPHERE: -2.00

## 2025-08-07 ASSESSMENT — KERATOMETRY
OS_AXISANGLE_DEGREES: 100
OS_K2POWER_DIOPTERS: 45.00
OS_K1POWER_DIOPTERS: 44.00
OD_AXISANGLE_DEGREES: 088
OD_K1POWER_DIOPTERS: 44.00
OD_K2POWER_DIOPTERS: 45.00

## 2025-08-07 ASSESSMENT — REFRACTION_CURRENTRX
OS_AXIS: 005
OD_AXIS: 008
OS_OVR_VA: 20/
OD_VPRISM_DIRECTION: SV
OS_CYLINDER: -0.25
OD_OVR_VA: 20/
OD_CYLINDER: -0.50
OS_VPRISM_DIRECTION: SV
OD_SPHERE: -0.75
OS_SPHERE: -2.00

## 2025-08-07 ASSESSMENT — CONFRONTATIONAL VISUAL FIELD TEST (CVF)
OD_FINDINGS: FULL
OS_FINDINGS: FULL

## 2025-08-07 ASSESSMENT — SUPERFICIAL PUNCTATE KERATITIS (SPK)
OD_SPK: 1+
OS_SPK: 1+

## 2025-08-07 ASSESSMENT — VISUAL ACUITY
OD_BCVA: 20/20-
OS_BCVA: 20/20-

## 2025-08-07 ASSESSMENT — TONOMETRY
OD_IOP_MMHG: 16
OS_IOP_MMHG: 17

## 2025-08-08 PROCEDURE — 99283 EMERGENCY DEPT VISIT LOW MDM: CPT

## 2025-08-08 PROCEDURE — 73130 X-RAY EXAM OF HAND: CPT

## 2025-08-08 PROCEDURE — 73130 X-RAY EXAM OF HAND: CPT | Mod: 26,RT

## 2025-08-08 RX ORDER — IBUPROFEN 200 MG
600 TABLET ORAL ONCE
Refills: 0 | Status: COMPLETED | OUTPATIENT
Start: 2025-08-08 | End: 2025-08-08

## 2025-08-08 RX ADMIN — Medication 600 MILLIGRAM(S): at 01:22

## 2025-09-11 DIAGNOSIS — M79.641 PAIN IN RIGHT HAND: ICD-10-CM

## 2025-09-12 ENCOUNTER — APPOINTMENT (OUTPATIENT)
Dept: ORTHOPEDIC SURGERY | Facility: CLINIC | Age: 20
End: 2025-09-12
Payer: COMMERCIAL

## 2025-09-12 VITALS
DIASTOLIC BLOOD PRESSURE: 75 MMHG | SYSTOLIC BLOOD PRESSURE: 113 MMHG | BODY MASS INDEX: 23.39 KG/M2 | HEIGHT: 63 IN | WEIGHT: 132 LBS

## 2025-09-12 DIAGNOSIS — S63.618S: ICD-10-CM

## 2025-09-12 DIAGNOSIS — M79.89 OTHER SPECIFIED SOFT TISSUE DISORDERS: ICD-10-CM

## 2025-09-12 DIAGNOSIS — M65.351 TRIGGER FINGER, RIGHT LITTLE FINGER: ICD-10-CM

## 2025-09-12 DIAGNOSIS — M25.541 PAIN IN JOINTS OF RIGHT HAND: ICD-10-CM

## 2025-09-12 PROCEDURE — 20600 DRAIN/INJ JOINT/BURSA W/O US: CPT | Mod: RT,59

## 2025-09-12 PROCEDURE — A4649 SURGICAL SUPPLIES: CPT

## 2025-09-12 PROCEDURE — 99204 OFFICE O/P NEW MOD 45 MIN: CPT | Mod: 25

## 2025-09-12 PROCEDURE — 73140 X-RAY EXAM OF FINGER(S): CPT | Mod: RT

## 2025-09-12 PROCEDURE — 20550 NJX 1 TENDON SHEATH/LIGAMENT: CPT | Mod: RT
